# Patient Record
(demographics unavailable — no encounter records)

---

## 2025-05-28 NOTE — PHYSICAL EXAM
[de-identified] : On physical examination the patient has a full range of motion of the left knee.  There is no effusion and no varus valgus instability.  She does have a mildly positive anterior drawer sign and Lachman sign.  There is a negative Yoav's sign for both the medial and lateral menisci.  Examination of the hip ankle and subtalar joints on the left is within normal limits.

## 2025-05-28 NOTE — HISTORY OF PRESENT ILLNESS
[de-identified] : This 49-year-old female is here for evaluation of a tear of the left ACL secondary to a skiing accident in 2018.  The patient has been wearing a [employer] type brace which she uses when skiing or other aggressive activity.  Patient never had surgical intervention.  She did do a good deal of physical therapy with excellent result.

## 2025-05-28 NOTE — ASSESSMENT
[FreeTextEntry1] : Chronic ACL tear left knee  A new brace has been ordered for this patient.  She will return on a as needed basis.  The